# Patient Record
Sex: FEMALE | Race: BLACK OR AFRICAN AMERICAN | NOT HISPANIC OR LATINO | Employment: FULL TIME | URBAN - METROPOLITAN AREA
[De-identification: names, ages, dates, MRNs, and addresses within clinical notes are randomized per-mention and may not be internally consistent; named-entity substitution may affect disease eponyms.]

---

## 2020-04-27 ENCOUNTER — TELEPHONE (OUTPATIENT)
Dept: RHEUMATOLOGY | Facility: CLINIC | Age: 40
End: 2020-04-27

## 2020-04-27 NOTE — TELEPHONE ENCOUNTER
Pt requesting refill for nifedipine, stated she has not been able to get in contact with anybody, see's Dr. Fields at [facility].    Notified Dr. Fields via phone call, Verbalized understanding.

## 2020-04-27 NOTE — TELEPHONE ENCOUNTER
Nicholas Fields MD  You 49 minutes ago (3:37 PM)      Please have patient or you can call her pharmacy and have them send to me at [facility] so I can refill.  You sent to me as message and not as refill    Routing comment